# Patient Record
Sex: MALE | Race: WHITE | URBAN - METROPOLITAN AREA
[De-identification: names, ages, dates, MRNs, and addresses within clinical notes are randomized per-mention and may not be internally consistent; named-entity substitution may affect disease eponyms.]

---

## 2019-03-21 ENCOUNTER — OFFICE VISIT (OUTPATIENT)
Dept: URBAN - METROPOLITAN AREA CLINIC 76 | Facility: CLINIC | Age: 84
End: 2019-03-21
Payer: MEDICARE

## 2019-03-21 DIAGNOSIS — H43.812 VITREOUS DEGENERATION, LEFT EYE: Primary | ICD-10-CM

## 2019-03-21 DIAGNOSIS — H25.813 COMBINED FORMS OF AGE-RELATED CATARACT, BILATERAL: ICD-10-CM

## 2019-03-21 PROCEDURE — 92004 COMPRE OPH EXAM NEW PT 1/>: CPT | Performed by: OPTOMETRIST

## 2019-03-21 ASSESSMENT — INTRAOCULAR PRESSURE
OD: 14
OS: 10

## 2019-03-21 NOTE — IMPRESSION/PLAN
Impression: Combined forms of age-related cataract, bilateral: H25.813. OU. OS>OD Plan: Cataracts account for the patient's complaints. Patient understands changing glasses will not improve vision. Recommend PRE OP w/ Dr. Lyn Talley for cataract surgery.

## 2019-03-21 NOTE — IMPRESSION/PLAN
Impression: Vitreous degeneration, left eye: H43.812. Plan: Posterior vitreous detachment accounts for the patient's complaints. There is no evidence of retinal pathology. All signs and risks of retinal detachment and tears were discussed in detail. Patient instructed to call the office immediately if any symptoms noted. No further treatment required, unless signs/symptoms of retinal detachment develop.

## 2019-03-21 NOTE — IMPRESSION/PLAN
Impression: Presbyopia: H52.4. Plan: Hold off on mrx for now. Unable to improve vision with refraction.

## 2019-04-17 ENCOUNTER — OFFICE VISIT (OUTPATIENT)
Dept: URBAN - METROPOLITAN AREA CLINIC 76 | Facility: CLINIC | Age: 84
End: 2019-04-17
Payer: MEDICARE

## 2019-04-17 DIAGNOSIS — H52.4 PRESBYOPIA: ICD-10-CM

## 2019-04-17 PROCEDURE — 99204 OFFICE O/P NEW MOD 45 MIN: CPT | Performed by: OPHTHALMOLOGY

## 2019-04-17 PROCEDURE — 99215 OFFICE O/P EST HI 40 MIN: CPT | Performed by: OPHTHALMOLOGY

## 2019-04-17 RX ORDER — DUREZOL 0.5 MG/ML
0.05 % EMULSION OPHTHALMIC
Qty: 1 | Refills: 1 | Status: INACTIVE
Start: 2019-04-17 | End: 2019-05-28

## 2019-04-17 RX ORDER — OFLOXACIN 3 MG/ML
0.3 % SOLUTION/ DROPS OPHTHALMIC
Qty: 1 | Refills: 1 | Status: INACTIVE
Start: 2019-04-17 | End: 2019-04-30

## 2019-04-17 ASSESSMENT — KERATOMETRY
OD: 46.25
OS: 45.38

## 2019-04-17 ASSESSMENT — INTRAOCULAR PRESSURE
OD: 14
OS: 14

## 2019-04-17 ASSESSMENT — VISUAL ACUITY
OS: 20/100
OD: 20/40

## 2019-04-17 NOTE — IMPRESSION/PLAN
Impression: Age-related nuclear cataract, bilateral: H25.13. OU. Visually significant Plan: Cataracts account for the patient's complaints. Discussed all risks, benefits, procedures and recovery. Patient understands changing glasses will not improve vision. Discussed added risk due to astigmatism. Patient desires to have surgery. Scheduled CE IOL OU , OS first.   Discussed iol options, recommend: STANDARD  iol. TARGET: DISTANCE          RL2 Atropine, Viscoat, [place] Discussed astigmatism diagnosis with patient. Patient understands that standard lens does not correct for astigmatism and patient will need gls for distance and near after Cataract Surgery. Patient understands.

## 2019-04-18 ENCOUNTER — PRE-OPERATIVE VISIT (OUTPATIENT)
Dept: URBAN - METROPOLITAN AREA CLINIC 76 | Facility: CLINIC | Age: 84
End: 2019-04-18
Payer: MEDICARE

## 2019-04-18 DIAGNOSIS — H25.13 AGE-RELATED NUCLEAR CATARACT, BILATERAL: Primary | ICD-10-CM

## 2019-04-18 PROCEDURE — 92025 CPTRIZED CORNEAL TOPOGRAPHY: CPT | Performed by: OPHTHALMOLOGY

## 2019-04-18 PROCEDURE — 76519 ECHO EXAM OF EYE: CPT | Performed by: OPHTHALMOLOGY

## 2019-04-18 ASSESSMENT — PACHYMETRY
OS: 22.75
OD: 3.13
OD: 22.76
OS: 3.44

## 2019-04-22 ENCOUNTER — SURGERY (OUTPATIENT)
Dept: URBAN - METROPOLITAN AREA SURGERY 47 | Facility: SURGERY | Age: 84
End: 2019-04-22
Payer: MEDICARE

## 2019-04-22 PROCEDURE — 66982 XCAPSL CTRC RMVL CPLX WO ECP: CPT | Performed by: OPHTHALMOLOGY

## 2019-04-23 ENCOUNTER — POST-OPERATIVE VISIT (OUTPATIENT)
Dept: URBAN - METROPOLITAN AREA CLINIC 76 | Facility: CLINIC | Age: 84
End: 2019-04-23
Payer: MEDICARE

## 2019-04-23 DIAGNOSIS — Z09 ENCNTR FOR F/U EXAM AFT TRTMT FOR COND OTH THAN MALIG NEOPLM: Primary | ICD-10-CM

## 2019-04-23 PROCEDURE — 99024 POSTOP FOLLOW-UP VISIT: CPT | Performed by: OPTOMETRIST

## 2019-04-23 ASSESSMENT — INTRAOCULAR PRESSURE
OS: 19
OD: 14

## 2019-04-30 ENCOUNTER — OFFICE VISIT (OUTPATIENT)
Dept: URBAN - METROPOLITAN AREA CLINIC 76 | Facility: CLINIC | Age: 84
End: 2019-04-30
Payer: MEDICARE

## 2019-04-30 DIAGNOSIS — H25.11 AGE-RELATED NUCLEAR CATARACT, RIGHT EYE: Primary | ICD-10-CM

## 2019-04-30 PROCEDURE — 92012 INTRM OPH EXAM EST PATIENT: CPT | Performed by: OPHTHALMOLOGY

## 2019-04-30 RX ORDER — OFLOXACIN 3 MG/ML
0.3 % SOLUTION/ DROPS OPHTHALMIC
Qty: 1 | Refills: 1 | Status: INACTIVE
Start: 2019-04-30 | End: 2019-05-28

## 2019-04-30 RX ORDER — DUREZOL 0.5 MG/ML
0.05 % EMULSION OPHTHALMIC
Qty: 30 | Refills: 1 | Status: INACTIVE
Start: 2019-04-30 | End: 2019-12-13

## 2019-04-30 ASSESSMENT — VISUAL ACUITY
OS: 20/30+1
OD: 20/40+2

## 2019-04-30 ASSESSMENT — INTRAOCULAR PRESSURE
OS: 12
OD: 13

## 2019-04-30 NOTE — IMPRESSION/PLAN
Impression: Age-related nuclear cataract, right eye: H25.11. OD. Visually significant Plan: Cataracts account for the patient's complaints. Discussed all risks, benefits, procedures and recovery. Patient understands changing glasses will not improve vision. Discussed added risk due to astigmatism. Patient desires to have surgery. Scheduled CE IOL OD. Discussed iol options, recommend: STANDARD  iol. TARGET: DISTANCE          RL2 Atropine, Viscoat, [place] Discussed astigmatism diagnosis with patient. Patient understands that standard lens does not correct for astigmatism and patient will need gls for distance and near after Cataract Surgery. Patient understands.

## 2019-05-20 ENCOUNTER — SURGERY (OUTPATIENT)
Dept: URBAN - METROPOLITAN AREA SURGERY 47 | Facility: SURGERY | Age: 84
End: 2019-05-20
Payer: MEDICARE

## 2019-05-20 PROCEDURE — 66982 XCAPSL CTRC RMVL CPLX WO ECP: CPT | Performed by: OPHTHALMOLOGY

## 2019-05-21 ENCOUNTER — POST-OPERATIVE VISIT (OUTPATIENT)
Dept: URBAN - METROPOLITAN AREA CLINIC 76 | Facility: CLINIC | Age: 84
End: 2019-05-21
Payer: MEDICARE

## 2019-05-21 PROCEDURE — 99024 POSTOP FOLLOW-UP VISIT: CPT | Performed by: OPTOMETRIST

## 2019-05-21 ASSESSMENT — INTRAOCULAR PRESSURE
OD: 22
OS: 12

## 2019-05-28 ENCOUNTER — POST-OPERATIVE VISIT (OUTPATIENT)
Dept: URBAN - METROPOLITAN AREA CLINIC 76 | Facility: CLINIC | Age: 84
End: 2019-05-28
Payer: MEDICARE

## 2019-05-28 PROCEDURE — 99024 POSTOP FOLLOW-UP VISIT: CPT | Performed by: OPTOMETRIST

## 2019-05-28 ASSESSMENT — VISUAL ACUITY
OD: 20/25-
OS: 20/25+

## 2019-05-28 ASSESSMENT — INTRAOCULAR PRESSURE
OD: 16
OS: 12

## 2019-06-14 ENCOUNTER — POST-OPERATIVE VISIT (OUTPATIENT)
Dept: URBAN - METROPOLITAN AREA CLINIC 76 | Facility: CLINIC | Age: 84
End: 2019-06-14
Payer: MEDICARE

## 2019-06-14 PROCEDURE — 99024 POSTOP FOLLOW-UP VISIT: CPT | Performed by: OPTOMETRIST

## 2019-06-14 ASSESSMENT — VISUAL ACUITY
OD: 20/25-2
OS: 20/30+2

## 2019-06-14 ASSESSMENT — INTRAOCULAR PRESSURE
OD: 16
OS: 13

## 2019-12-13 ENCOUNTER — OFFICE VISIT (OUTPATIENT)
Dept: URBAN - METROPOLITAN AREA CLINIC 76 | Facility: CLINIC | Age: 84
End: 2019-12-13
Payer: MEDICARE

## 2019-12-13 DIAGNOSIS — H26.492 OTHER SECONDARY CATARACT, LEFT EYE: ICD-10-CM

## 2019-12-13 DIAGNOSIS — Z96.1 PRESENCE OF INTRAOCULAR LENS: Primary | ICD-10-CM

## 2019-12-13 DIAGNOSIS — Z98.41 CATARACT EXTRACTION STATUS, RIGHT EYE: ICD-10-CM

## 2019-12-13 DIAGNOSIS — Z98.42 CATARACT EXTRACTION STATUS, LEFT EYE: ICD-10-CM

## 2019-12-13 PROCEDURE — 92014 COMPRE OPH EXAM EST PT 1/>: CPT | Performed by: OPTOMETRIST

## 2019-12-13 ASSESSMENT — KERATOMETRY
OS: 45.38
OD: 46.50

## 2019-12-13 ASSESSMENT — INTRAOCULAR PRESSURE
OD: 15
OS: 14

## 2019-12-13 NOTE — IMPRESSION/PLAN
Impression: Other secondary cataract, left eye: H26.492. OS. not affecting vision. Plan: Observe for now.

## 2021-01-05 ENCOUNTER — OFFICE VISIT (OUTPATIENT)
Dept: URBAN - METROPOLITAN AREA CLINIC 76 | Facility: CLINIC | Age: 86
End: 2021-01-05
Payer: MEDICARE

## 2021-01-05 DIAGNOSIS — H35.3131 NONEXUDATIVE AGE-RELATED MACULAR DEGENERATION, BILATERAL, EARLY DRY STAGE: Primary | ICD-10-CM

## 2021-01-05 PROCEDURE — 99213 OFFICE O/P EST LOW 20 MIN: CPT | Performed by: OPTOMETRIST

## 2021-01-05 ASSESSMENT — INTRAOCULAR PRESSURE
OD: 13
OS: 13

## 2021-01-05 ASSESSMENT — KERATOMETRY
OD: 49.38
OS: 45.63

## 2021-01-05 NOTE — IMPRESSION/PLAN
Impression: Nonexudative age-related macular degeneration, bilateral, early dry stage: H35.3131. Stable Plan: Discussed diagnosis in detail with patient. Counseling given about the benefits and/or risks of the Age-Related Eye Disease Study (AREDS) formulation for preventing progression of age-related macular degeneration (AMD). Add lutein 20-30 mg, zeaxanthin 2-5mg per day with MV or AREDS 2 MV PO daily as directed. Will continue to observe condition and or symptoms. Call if [address] worsens. Continue to use [employer] grid.

## 2022-01-05 ENCOUNTER — OFFICE VISIT (OUTPATIENT)
Dept: URBAN - METROPOLITAN AREA CLINIC 76 | Facility: CLINIC | Age: 87
End: 2022-01-05
Payer: MEDICARE

## 2022-01-05 DIAGNOSIS — H02.889 MGD OF EYE: ICD-10-CM

## 2022-01-05 PROCEDURE — 92134 CPTRZ OPH DX IMG PST SGM RTA: CPT | Performed by: OPTOMETRIST

## 2022-01-05 PROCEDURE — 99214 OFFICE O/P EST MOD 30 MIN: CPT | Performed by: OPTOMETRIST

## 2022-01-05 ASSESSMENT — INTRAOCULAR PRESSURE
OD: 14
OS: 14

## 2022-01-05 ASSESSMENT — KERATOMETRY
OD: 46.75
OS: 45.25

## 2022-01-05 NOTE — IMPRESSION/PLAN
Impression: Nonexudative age-related macular degeneration, bilateral, early dry stage: H35.3131. Bilateral. 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 Plan: Discussed diagnosis in detail with patient. Counseling given about the benefits and/or risks of the Age-Related Eye Disease Study (AREDS) formulation for preventing progression of age-related macular degeneration (AMD). Add lutein 20-30 mg, zeaxanthin 2-5mg per day with MV or AREDS 2 MV PO daily as directed. Will continue to observe condition and or symptoms. Call if [address] worsens. Dispensed and explained use of [employer]ler grid.

## 2023-01-05 ENCOUNTER — OFFICE VISIT (OUTPATIENT)
Dept: URBAN - METROPOLITAN AREA CLINIC 76 | Facility: CLINIC | Age: 88
End: 2023-01-05
Payer: MEDICARE

## 2023-01-05 DIAGNOSIS — H35.3131 NONEXUDATIVE AGE-RELATED MACULAR DEGENERATION, BILATERAL, EARLY DRY STAGE: Primary | ICD-10-CM

## 2023-01-05 DIAGNOSIS — Z96.1 PRESENCE OF INTRAOCULAR LENS: ICD-10-CM

## 2023-01-05 PROCEDURE — 92134 CPTRZ OPH DX IMG PST SGM RTA: CPT | Performed by: OPTOMETRIST

## 2023-01-05 PROCEDURE — 99214 OFFICE O/P EST MOD 30 MIN: CPT | Performed by: OPTOMETRIST

## 2023-01-05 ASSESSMENT — KERATOMETRY
OD: 46.25
OS: 45.38

## 2023-01-05 ASSESSMENT — INTRAOCULAR PRESSURE
OD: 14
OS: 13

## 2023-01-05 NOTE — IMPRESSION/PLAN
Impression: Nonexudative age-related macular degeneration, bilateral, early dry stage: H35.3131. Bilateral.
MAC OCT: mild drusen, stable OU. Plan: Discussed diagnosis in detail with patient. Counseling given about the benefits and/or risks of the Age-Related Eye Disease Study (AREDS) formulation for preventing progression of age-related macular degeneration (AMD). Add lutein 20-30 mg, zeaxanthin 2-5mg per day with MV or AREDS 2 MV PO daily as directed. Will continue to observe condition and or symptoms. Call if [address] worsens. Dispensed and explained use of [employer] grid. Recommend MAC OCT every other year.

## 2023-12-19 NOTE — IMPRESSION/PLAN
Impression: Nonexudative age-related macular degeneration, bilateral, early dry stage: H35.3131. Plan: Discussed diagnosis in detail with patient. Counseling given about the benefits and/or risks of the Age-Related Eye Disease Study (AREDS) formulation for preventing progression of age-related macular degeneration (AMD). Add lutein 20-30 mg, zeaxanthin 2-5mg per day with MV or AREDS 2 MV PO daily as directed. Will continue to observe condition and or symptoms. Call if [address] worsens. Dispensed and explained use of [employer] grid. Normal for race

## 2024-01-05 ENCOUNTER — OFFICE VISIT (OUTPATIENT)
Dept: URBAN - METROPOLITAN AREA CLINIC 76 | Facility: CLINIC | Age: 89
End: 2024-01-05
Payer: MEDICARE

## 2024-01-05 DIAGNOSIS — Z96.1 PRESENCE OF INTRAOCULAR LENS: ICD-10-CM

## 2024-01-05 DIAGNOSIS — H35.3131 NONEXUDATIVE AGE-RELATED MACULAR DEGENERATION, BILATERAL, EARLY DRY STAGE: Primary | ICD-10-CM

## 2024-01-05 DIAGNOSIS — H52.4 PRESBYOPIA: ICD-10-CM

## 2024-01-05 DIAGNOSIS — H04.123 DRY EYE SYNDROME OF BILATERAL LACRIMAL GLANDS: ICD-10-CM

## 2024-01-05 PROCEDURE — 99214 OFFICE O/P EST MOD 30 MIN: CPT | Performed by: OPTOMETRIST

## 2024-01-05 RX ORDER — APIXABAN 5 MG/1
5 MG TABLET, FILM COATED ORAL
Qty: 0 | Refills: 0 | Status: ACTIVE
Start: 2024-01-05

## 2024-01-05 RX ORDER — METOPROLOL SUCCINATE 25 MG/1
25 MG TABLET, FILM COATED, EXTENDED RELEASE ORAL
Qty: 0 | Refills: 0 | Status: ACTIVE
Start: 2024-01-05

## 2024-01-05 ASSESSMENT — KERATOMETRY
OD: 52.88
OS: 46.00

## 2024-01-05 ASSESSMENT — INTRAOCULAR PRESSURE
OD: 12
OS: 13

## 2025-01-10 ENCOUNTER — OFFICE VISIT (OUTPATIENT)
Dept: URBAN - METROPOLITAN AREA CLINIC 76 | Facility: CLINIC | Age: OVER 89
End: 2025-01-10
Payer: MEDICARE

## 2025-01-10 DIAGNOSIS — Z96.1 PRESENCE OF INTRAOCULAR LENS: ICD-10-CM

## 2025-01-10 DIAGNOSIS — H35.3131 NONEXUDATIVE AGE-RELATED MACULAR DEGENERATION, BILATERAL, EARLY DRY STAGE: Primary | ICD-10-CM

## 2025-01-10 PROCEDURE — 99214 OFFICE O/P EST MOD 30 MIN: CPT | Performed by: OPTOMETRIST

## 2025-01-10 ASSESSMENT — INTRAOCULAR PRESSURE
OD: 11
OS: 12

## 2025-01-10 ASSESSMENT — KERATOMETRY
OS: 45.38
OD: 46.00